# Patient Record
Sex: FEMALE | Race: WHITE | URBAN - METROPOLITAN AREA
[De-identification: names, ages, dates, MRNs, and addresses within clinical notes are randomized per-mention and may not be internally consistent; named-entity substitution may affect disease eponyms.]

---

## 2017-01-16 ENCOUNTER — HOSPITAL ENCOUNTER (OUTPATIENT)
Dept: OTHER | Age: 37
Discharge: HOME | End: 2017-01-16
Attending: OBSTETRICS & GYNECOLOGY | Admitting: OBSTETRICS & GYNECOLOGY

## 2017-03-08 ENCOUNTER — TELEPHONE (OUTPATIENT)
Dept: FAMILY MEDICINE CLINIC | Age: 37
End: 2017-03-08

## 2020-11-11 ENCOUNTER — OFFICE VISIT (OUTPATIENT)
Dept: PRIMARY CARE CLINIC | Age: 40
End: 2020-11-11

## 2020-11-11 ENCOUNTER — HOSPITAL ENCOUNTER (OUTPATIENT)
Age: 40
Setting detail: SPECIMEN
Discharge: HOME | End: 2020-11-11
Payer: COMMERCIAL

## 2020-11-11 VITALS — OXYGEN SATURATION: 100 % | TEMPERATURE: 97.1 F | HEART RATE: 105 BPM

## 2020-11-11 PROCEDURE — 99213 OFFICE O/P EST LOW 20 MIN: CPT | Performed by: FAMILY MEDICINE

## 2020-11-11 PROCEDURE — U0002 COVID-19 LAB TEST NON-CDC: HCPCS

## 2020-11-11 NOTE — PROGRESS NOTES
11/11/20  Jorgensen Limb Appis  1980    FLU/COVID-19 CLINIC EVALUATION    HPI SYMPTOMS:    Employer: [employer]    [] Fevers  [] Chills  [x] Cough  [] Coughing up blood  [] Chest Congestion  [x] Nasal Congestion  [] Feeling short of breath  [] Sometimes  [] Frequently  [] All the time  [] Chest pain  [x] Headaches  [x]Tolerable  [] Severe  [x] Sore throat  [] Muscle aches  [] Nausea  [] Vomiting  []Unable to keep fluids down  [] Diarrhea  []Severe    [x] OTHER SYMPTOMS: Runny nose      Symptom Duration:   [] 1  [] 2   [] 3   [] 4    [] 5   [] 6   [] 7   [] 8   [x] 9   [] 10   [] 11   [] 12   [] 13   [] 14   [] Longer than 14 days    Symptom course:   [] Worsening     [x] Stable     [] Improving    RISK FACTORS:    [] Pregnant or possibly pregnant  [] Age over 61  [] Diabetes  [] Heart disease  [] Asthma  [] COPD/Other chronic lung diseases  [] Active Cancer  [] On Chemotherapy  [x] Taking oral steroids  [] History Lymphoma/Leukemia  [] Close contact with a lab confirmed COVID-19 patient within 14 days of symptom onset  [] History of travel from affected geographical areas within 14 days of symptom onset       VITALS:  There were no vitals filed for this visit. TESTS:    POCT FLU:  [] Positive     []Negative    ASSESSMENT:    [] Flu  [] Possible COVID-19  [] Strep    PLAN:    [] Discharge home with written instructions for:  [] Flu management  [] Possible COVID-19 infection with self-quarantine and management of symptoms  [] Follow-up with primary care physician or emergency department if worsens  [] Evaluation per physician/NP/PA in clinic  [] Sent to ER       An  electronic signature was used to authenticate this note.      --Reyes Ward MA on 11/11/2020 at 12:59 PM

## 2020-11-11 NOTE — PATIENT INSTRUCTIONS
Your COVID 19 test can take 3-5 days for the results come back. We ask that you make a Mychart page and view your test results this way. You will need to Self quarantine until you know your results. Increase fluids rest  Saline nasal spray as directed  Warm salt gargles for throat discomfort  Monitor temperature twice a day  Tylenol for fevers and/or discomfort. If symptoms are worse -Go to the ER. Follow up with your primary doctor    To Whom it May Concern:    Charlene Blank has been tested for COVID on 11/11/20. They may NOT return to work until their lab test results back and they been fever free for 3 days. If test is positive they must stay home for 2 weeks or until they test negative or as directed by the [facility] Department.

## 2020-11-11 NOTE — PROGRESS NOTES
11/11/2020    HPI:  Chief complaint and history of present illness as per medical assistant/nurse documented today in the Flu/COVID-19 clinic. MEDICATIONS:  Prior to Visit Medications    Medication Sig Taking? Authorizing Provider   valACYclovir (VALTREX) 1 G tablet Take 2 tablets by mouth 2 times daily For 1 day. Marcella Bennett DO   FLUoxetine (PROZAC) 10 MG tablet take 1 tablet by mouth every evening  Marcella Bennett DO   aluminum chloride (DRYSOL) 20 % external solution Apply topically once to arm pit area at night prior to next appt. Marcella Bennett DO   loratadine (CLARITIN) 10 MG tablet Take 1 tablet by mouth daily. Marcella Bennett DO       Allergies   Allergen Reactions    Vicodin [Hydrocodone-Acetaminophen] Itching   ,   Past Medical History:   Diagnosis Date    Anemia 2012    pregnancy induced    Postpartum depression 2004    after initial delivery    Second trimester bleeding 6/8/2012    Vaginal bleeding before 22 weeks gestation 4/18/2012   , No past surgical history on file.,   Social History     Tobacco Use    Smoking status: Never Smoker    Smokeless tobacco: Never Used   Substance Use Topics    Alcohol use: No    Drug use: No       PHYSICAL EXAM:  Physical Exam  Vitals signs and nursing note reviewed. Constitutional:       Appearance: Normal appearance. She is normal weight. HENT:      Head: Normocephalic and atraumatic. Right Ear: Tympanic membrane normal.      Left Ear: Tympanic membrane normal.      Nose: Nose normal. No congestion or rhinorrhea. Mouth/Throat:      Mouth: Mucous membranes are moist.      Pharynx: Oropharynx is clear. No oropharyngeal exudate or posterior oropharyngeal erythema. Eyes:      Extraocular Movements: Extraocular movements intact. Conjunctiva/sclera: Conjunctivae normal.      Pupils: Pupils are equal, round, and reactive to light. Neck:      Musculoskeletal: Normal range of motion and neck supple.    Cardiovascular:

## 2020-11-12 LAB
SARS-COV-2: DETECTED
SOURCE: ABNORMAL

## 2021-03-02 ENCOUNTER — HOSPITAL ENCOUNTER (EMERGENCY)
Age: 41
Discharge: HOME | End: 2021-03-02
Attending: EMERGENCY MEDICINE
Payer: COMMERCIAL

## 2021-03-02 VITALS
OXYGEN SATURATION: 100 % | SYSTOLIC BLOOD PRESSURE: 178 MMHG | BODY MASS INDEX: 27.31 KG/M2 | RESPIRATION RATE: 14 BRPM | DIASTOLIC BLOOD PRESSURE: 104 MMHG | TEMPERATURE: 97.7 F | HEART RATE: 79 BPM | WEIGHT: 160 LBS | HEIGHT: 64 IN

## 2021-03-02 DIAGNOSIS — R05.9 COUGH: ICD-10-CM

## 2021-03-02 DIAGNOSIS — R53.83 FATIGUE, UNSPECIFIED TYPE: ICD-10-CM

## 2021-03-02 DIAGNOSIS — R68.83 CHILLS: ICD-10-CM

## 2021-03-02 DIAGNOSIS — R51.9 NONINTRACTABLE HEADACHE, UNSPECIFIED CHRONICITY PATTERN, UNSPECIFIED HEADACHE TYPE: Primary | ICD-10-CM

## 2021-03-02 PROCEDURE — 6360000002 HC RX W HCPCS: Performed by: EMERGENCY MEDICINE

## 2021-03-02 PROCEDURE — 99283 EMERGENCY DEPT VISIT LOW MDM: CPT

## 2021-03-02 PROCEDURE — 6370000000 HC RX 637 (ALT 250 FOR IP): Performed by: EMERGENCY MEDICINE

## 2021-03-02 RX ORDER — IBUPROFEN 400 MG/1
800 TABLET ORAL ONCE
Status: COMPLETED | OUTPATIENT
Start: 2021-03-02 | End: 2021-03-02

## 2021-03-02 RX ORDER — DEXAMETHASONE 4 MG/1
10 TABLET ORAL ONCE
Status: COMPLETED | OUTPATIENT
Start: 2021-03-02 | End: 2021-03-02

## 2021-03-02 RX ADMIN — IBUPROFEN 800 MG: 400 TABLET, FILM COATED ORAL at 17:03

## 2021-03-02 RX ADMIN — DEXAMETHASONE 10 MG: 4 TABLET ORAL at 18:00

## 2021-03-02 ASSESSMENT — PAIN SCALES - GENERAL: PAINLEVEL_OUTOF10: 5

## 2021-03-02 ASSESSMENT — PAIN DESCRIPTION - DESCRIPTORS: DESCRIPTORS: ACHING;PRESSURE

## 2021-03-02 NOTE — ED PROVIDER NOTES
Triage Chief Complaint:   Headache    [place]:  Jeremiah Bond is a 36 y.o. female that presents with complaint of feeling unwell since last Wednesday. She was initially achy and having chills. She had a temperature to 99.2 on that day. Friday she began feeling tingling on her face and body and is felt like her face was hot for the last couple of days. She describes as feeling like she has been outside in the heat. No painful feeling of heat on her face. She also feels like her face is slightly swollen. Her ears also feel warm. She has had an associated headache that started on Wednesday with the initial symptoms. Headache is frontal in origin and waxes and wanes but does not fully go away. She denies any nausea or vomiting. She has tried Tylenol for symptoms without improvement. She also has had a dry cough for the last couple of days. She denies any shortness of breath or chest pain. She has felt more fatigued than normal.  She denies any urinary symptoms. Patient had Covid in the fall. ROS:   Review of Systems   Constitutional: Positive for fatigue. Negative for chills and fever. HENT: Negative for congestion, ear discharge, ear pain, hearing loss, rhinorrhea and sore throat. Face feel hot and swollen  Ears feel warm     Eyes: Negative for photophobia, redness and visual disturbance. Respiratory: Positive for cough (dry x 2 days). Negative for shortness of breath and wheezing. Cardiovascular: Negative for chest pain and leg swelling. Gastrointestinal: Negative for abdominal pain, nausea and vomiting. Genitourinary: Negative for dysuria and frequency. Musculoskeletal: Negative for arthralgias, back pain, neck pain and neck stiffness. Skin: Negative for rash and wound. Neurological: Positive for headaches. Negative for dizziness, syncope, weakness, light-headedness and numbness. Psychiatric/Behavioral: Negative. Negative for hallucinations and suicidal ideas.        Past Medical History:   Diagnosis Date    Anemia 2012    pregnancy induced    Postpartum depression 2004    after initial delivery    Second trimester bleeding 6/8/2012    Vaginal bleeding before 22 weeks gestation 4/18/2012     Past Surgical History:   Procedure Laterality Date    HYSTERECTOMY       Family History   Problem Relation Age of Onset    Cancer Mother 39        uterine cancer    High Blood Pressure Mother     Hearing Loss Mother     High Blood Pressure Father      Social History     Socioeconomic History    Marital status:      Spouse name: Not on file    Number of children: Not on file    Years of education: Not on file    Highest education level: Not on file   Occupational History    Not on file   Social Needs    Financial resource strain: Not on file    Food insecurity     Worry: Not on file     Inability: Not on file    Transportation needs     Medical: Not on file     Non-medical: Not on file   Tobacco Use    Smoking status: Never Smoker    Smokeless tobacco: Never Used   Substance and Sexual Activity    Alcohol use: No    Drug use: No    Sexual activity: Yes     Partners: Male   Lifestyle    Physical activity     Days per week: Not on file     Minutes per session: Not on file    Stress: Not on file   Relationships    Social connections     Talks on phone: Not on file     Gets together: Not on file     Attends L[employer]-day service: Not on file     Active member of club or organization: Not on file     Attends meetings of clubs or organizations: Not on file     Relationship status: Not on file    Intimate partner violence     Fear of current or ex partner: Not on file     Emotionally abused: Not on file     Physically abused: Not on file     Forced sexual activity: Not on file   Other Topics Concern    Not on file   Social History Narrative    Not on file     No current facility-administered medications for this encounter. No current outpatient medications on file. Allergies   Allergen Reactions    Vicodin [Hydrocodone-Acetaminophen] Itching       Nursing Notes Reviewed     Physical Exam:   ED Triage Vitals [03/02/21 1629]   Enc Vitals Group      BP (!) 178/104      Pulse 79      Resp 14      Temp 97.7 °F (36.5 °C)      Temp Source Infrared      SpO2 100 %      Weight 160 lb (72.6 kg)      Height 5' 4\" (1.626 m)      Head Circumference       Peak Flow       Pain Score       Pain Loc       Pain Edu? Excl. in [address]? BP (!) 178/104   Pulse 79   Temp 97.7 °F (36.5 °C) (Infrared)   Resp 14   Ht 5' 4\" (1.626 m)   Wt 160 lb (72.6 kg)   LMP 10/16/2016   SpO2 100%   BMI 27.46 kg/m²   My pulse ox interpretation is - normal  Physical Exam  Vitals signs and nursing note reviewed. Constitutional:       General: She is not in acute distress. Appearance: She is well-developed. She is not toxic-appearing or diaphoretic. Comments: Appears uncomfortable     HENT:      Head: Normocephalic and atraumatic. Comments: No obvious erythema or swelling to pt's face    Eyes:      General:         Right eye: No discharge. Left eye: No discharge. Extraocular Movements: Extraocular movements intact. Conjunctiva/sclera: Conjunctivae normal.      Pupils: Pupils are equal, round, and reactive to light. Neck:      Musculoskeletal: Normal range of motion. No spinous process tenderness or muscular tenderness. Meningeal: Brudzinski's sign and Kernig's sign absent. Cardiovascular:      Rate and Rhythm: Normal rate and regular rhythm. Pulmonary:      Effort: Pulmonary effort is normal. No respiratory distress. Breath sounds: Normal breath sounds. No wheezing or rhonchi. Abdominal:      General: There is no distension. Palpations: Abdomen is soft. Tenderness: There is no abdominal tenderness. Musculoskeletal: Normal range of motion. General: No swelling or signs of injury. Skin:     General: Skin is warm and dry. Neurological:      General: No focal deficit present. Mental Status: She is alert. Cranial Nerves: No cranial nerve deficit. Sensory: No sensory deficit. Motor: No weakness. Gait: Gait normal.   Psychiatric:         Mood and Affect: Mood normal.         Behavior: Behavior normal.         I have reviewed and interpreted all of the currently available lab results from this visit (if applicable):  Results for orders placed or performed during the hospital encounter of 03/02/21   COVID-19    Specimen: Nasopharyngeal   Result Value Ref Range    Source THROAT     SARS-CoV-2 NOT DETECTED NOT DETECTED      Radiographs (if obtained):  [] The following radiograph was interpreted by myself in the absence of a radiologist:  [x]Radiologist's Report Reviewed:  No orders to display         EKG (if obtained): (All EKG's are interpreted by myself in the absence of a cardiologist)    MDM:  Differential diagnoses considered include but are not limited to tension headache, migraine headache, dehydration, intracranial mass lesion, subarachnoid hemorrhage, meninginitis, covid-19, seasonal allergies, influenza, viral URI. Patient appear uncomfortable upon arrival but is non-toxic appearing. Pt does not have any signs or symptoms concerning for emergent cause of headache such as fever, neck stiffness, focal neurologic deficits, altered mental status or acute onset of worst headache of life. Headache did not start during exertion. I do not believe CT scan is warranted at this time. I do not suspect an emergent cause of patient's headache. Patient treated with ibuprofen and decadron in the ER. After medication, headache is significantly improved and patient feels well enough to be discharged. Given patient's symptoms, will test her for COVID-19. While it is less likely given patient's previous Covid diagnosis, her symptoms are concerning for Covid.   I did recommend she self isolate until the results of the covid test is known and recommended she continue to isolate at the test is positive. Patient discharged home in stable condition. Recommended close follow up with a Primary Care Provider. Plan of care explained to patient. Concerning signs and symptoms warranting a return visit to the Emergency Department were explained in detail. All questions and concerns were addressed to the patient's satisfaction. Patient understood and agreed with plan. I did don appropriate PPE (including N95 face mask, protective eye ware/safety glasses, gloves, hair covering, and no isolation gown), as recommended by the health facility/national standard best practice, during my bedside interactions with the patient. The likelihood of other entities in the differential is insufficient to justify any further testing for them. This was explained to the patient. The patient was advised that persistent or worsening symptoms would requirefurther evaluation. Clinical Impression:  1. Nonintractable headache, unspecified chronicity pattern, unspecified headache type    2. Chills    3. Fatigue, unspecified type    4. Cough          Aly Posey MD       Please note that portions of this note may have been complete with a voice recognition program.  Effortswere made to edit the dictations, but occasional words are mis-transcribed.           Aly Posey MD  03/07/21 1589

## 2021-03-03 ENCOUNTER — CARE COORDINATION (OUTPATIENT)
Dept: CARE COORDINATION | Age: 41
End: 2021-03-03

## 2021-03-03 LAB
SARS-COV-2: NOT DETECTED
SOURCE: NORMAL

## 2021-03-03 NOTE — CARE COORDINATION
Attempted outreach call for ED f/u and COVID-19 monitoring; left a VM with ACM call-back information. If no call back, will make another attempt. Aly Saldaña RN, BSN  Care Coordinator  Cell [phone]  Email [email or web address]

## 2021-03-04 NOTE — CARE COORDINATION
Attempted outreach call for ED f/u and COVID-19 monitoring; left a VM with AC call-back information. 2nd attempt, no further outreach at this time. Did sent [employer] message with information. No future appointments. Kaylyn Scruggs RN, BSN  Care Coordinator  Cell [phone]  Email [email or web address]

## 2021-03-07 ASSESSMENT — ENCOUNTER SYMPTOMS
SHORTNESS OF BREATH: 0
EYE REDNESS: 0
WHEEZING: 0
VOMITING: 0
COUGH: 1
BACK PAIN: 0
SORE THROAT: 0
ABDOMINAL PAIN: 0
RHINORRHEA: 0
PHOTOPHOBIA: 0
NAUSEA: 0